# Patient Record
Sex: MALE | URBAN - METROPOLITAN AREA
[De-identification: names, ages, dates, MRNs, and addresses within clinical notes are randomized per-mention and may not be internally consistent; named-entity substitution may affect disease eponyms.]

---

## 2022-01-25 ENCOUNTER — APPOINTMENT (RX ONLY)
Dept: URBAN - METROPOLITAN AREA CLINIC 330 | Facility: CLINIC | Age: 67
Setting detail: DERMATOLOGY
End: 2022-01-25

## 2022-01-25 DIAGNOSIS — L82.1 OTHER SEBORRHEIC KERATOSIS: ICD-10-CM

## 2022-01-25 DIAGNOSIS — L21.8 OTHER SEBORRHEIC DERMATITIS: ICD-10-CM

## 2022-01-25 DIAGNOSIS — D18.0 HEMANGIOMA: ICD-10-CM

## 2022-01-25 DIAGNOSIS — L81.4 OTHER MELANIN HYPERPIGMENTATION: ICD-10-CM

## 2022-01-25 DIAGNOSIS — L57.0 ACTINIC KERATOSIS: ICD-10-CM

## 2022-01-25 DIAGNOSIS — D22 MELANOCYTIC NEVI: ICD-10-CM

## 2022-01-25 PROBLEM — D22.4 MELANOCYTIC NEVI OF SCALP AND NECK: Status: ACTIVE | Noted: 2022-01-25

## 2022-01-25 PROBLEM — D22.62 MELANOCYTIC NEVI OF LEFT UPPER LIMB, INCLUDING SHOULDER: Status: ACTIVE | Noted: 2022-01-25

## 2022-01-25 PROBLEM — D18.01 HEMANGIOMA OF SKIN AND SUBCUTANEOUS TISSUE: Status: ACTIVE | Noted: 2022-01-25

## 2022-01-25 PROBLEM — D22.61 MELANOCYTIC NEVI OF RIGHT UPPER LIMB, INCLUDING SHOULDER: Status: ACTIVE | Noted: 2022-01-25

## 2022-01-25 PROBLEM — D22.5 MELANOCYTIC NEVI OF TRUNK: Status: ACTIVE | Noted: 2022-01-25

## 2022-01-25 PROCEDURE — ? COUNSELING

## 2022-01-25 PROCEDURE — ? PRESCRIPTION

## 2022-01-25 PROCEDURE — 99213 OFFICE O/P EST LOW 20 MIN: CPT | Mod: 25

## 2022-01-25 PROCEDURE — ? FULL BODY SKIN EXAM

## 2022-01-25 PROCEDURE — ? LIQUID NITROGEN

## 2022-01-25 PROCEDURE — ? ADDITIONAL NOTES

## 2022-01-25 PROCEDURE — 17000 DESTRUCT PREMALG LESION: CPT

## 2022-01-25 PROCEDURE — ? PRESCRIPTION MEDICATION MANAGEMENT

## 2022-01-25 PROCEDURE — 17003 DESTRUCT PREMALG LES 2-14: CPT

## 2022-01-25 PROCEDURE — ? TREATMENT REGIMEN

## 2022-01-25 RX ORDER — CLOBETASOL PROPIONATE 0.5 MG/ML
SOLUTION TOPICAL
Qty: 50 | Refills: 4 | Status: ERX | COMMUNITY
Start: 2022-01-25

## 2022-01-25 RX ADMIN — CLOBETASOL PROPIONATE: 0.5 SOLUTION TOPICAL at 00:00

## 2022-01-25 ASSESSMENT — LOCATION DETAILED DESCRIPTION DERM
LOCATION DETAILED: LEFT CLAVICULAR SKIN
LOCATION DETAILED: RIGHT CLAVICULAR NECK
LOCATION DETAILED: RIGHT INFERIOR LATERAL FOREHEAD
LOCATION DETAILED: LEFT DISTAL POSTERIOR UPPER ARM
LOCATION DETAILED: LEFT PROXIMAL POSTERIOR UPPER ARM
LOCATION DETAILED: LEFT LATERAL DISTAL PRETIBIAL REGION
LOCATION DETAILED: LEFT BUTTOCK
LOCATION DETAILED: INFERIOR THORACIC SPINE
LOCATION DETAILED: LEFT PROXIMAL PRETIBIAL REGION
LOCATION DETAILED: LEFT SUPERIOR UPPER BACK
LOCATION DETAILED: RIGHT LATERAL SUPERIOR CHEST
LOCATION DETAILED: RIGHT PROXIMAL PRETIBIAL REGION
LOCATION DETAILED: LEFT INFERIOR UPPER BACK
LOCATION DETAILED: LEFT INFERIOR LATERAL LOWER BACK
LOCATION DETAILED: RIGHT MEDIAL TRAPEZIAL NECK
LOCATION DETAILED: LEFT INFERIOR MEDIAL MIDBACK
LOCATION DETAILED: RIGHT ANTERIOR PROXIMAL UPPER ARM
LOCATION DETAILED: RIGHT PROXIMAL POSTERIOR UPPER ARM
LOCATION DETAILED: LEFT ANTERIOR SHOULDER
LOCATION DETAILED: LEFT CENTRAL MALAR CHEEK
LOCATION DETAILED: RIGHT DISTAL POSTERIOR UPPER ARM
LOCATION DETAILED: LEFT CENTRAL ZYGOMA

## 2022-01-25 ASSESSMENT — LOCATION ZONE DERM
LOCATION ZONE: ARM
LOCATION ZONE: LEG
LOCATION ZONE: TRUNK
LOCATION ZONE: NECK
LOCATION ZONE: TRUNK
LOCATION ZONE: FACE

## 2022-01-25 ASSESSMENT — LOCATION SIMPLE DESCRIPTION DERM
LOCATION SIMPLE: LEFT ZYGOMA
LOCATION SIMPLE: LEFT LOWER BACK
LOCATION SIMPLE: LEFT PRETIBIAL REGION
LOCATION SIMPLE: LEFT BUTTOCK
LOCATION SIMPLE: LEFT POSTERIOR UPPER ARM
LOCATION SIMPLE: RIGHT UPPER ARM
LOCATION SIMPLE: RIGHT PRETIBIAL REGION
LOCATION SIMPLE: UPPER BACK
LOCATION SIMPLE: LEFT SHOULDER
LOCATION SIMPLE: LEFT CHEEK
LOCATION SIMPLE: RIGHT ANTERIOR NECK
LOCATION SIMPLE: LEFT CLAVICULAR SKIN
LOCATION SIMPLE: LEFT UPPER ARM
LOCATION SIMPLE: LEFT UPPER BACK
LOCATION SIMPLE: CHEST
LOCATION SIMPLE: RIGHT POSTERIOR UPPER ARM
LOCATION SIMPLE: LEFT LOWER BACK
LOCATION SIMPLE: POSTERIOR NECK
LOCATION SIMPLE: RIGHT FOREHEAD

## 2024-09-24 ENCOUNTER — HOSPITAL ENCOUNTER (OUTPATIENT)
Dept: PHYSICAL THERAPY | Age: 69
Setting detail: RECURRING SERIES
Discharge: HOME | End: 2024-09-27
Payer: COMMERCIAL

## 2024-09-24 DIAGNOSIS — G89.29 CHRONIC RIGHT SHOULDER PAIN: Primary | ICD-10-CM

## 2024-09-24 DIAGNOSIS — R29.898 DECREASED STRENGTH OF UPPER EXTREMITY: ICD-10-CM

## 2024-09-24 DIAGNOSIS — M25.611 STIFFNESS OF RIGHT SHOULDER, NOT ELSEWHERE CLASSIFIED: ICD-10-CM

## 2024-09-24 DIAGNOSIS — M25.511 CHRONIC RIGHT SHOULDER PAIN: Primary | ICD-10-CM

## 2024-09-24 PROCEDURE — 97110 THERAPEUTIC EXERCISES: CPT

## 2024-09-24 PROCEDURE — 97161 PT EVAL LOW COMPLEX 20 MIN: CPT

## 2024-09-24 ASSESSMENT — PAIN SCALES - GENERAL: PAINLEVEL_OUTOF10: 5

## 2024-09-26 ENCOUNTER — HOSPITAL ENCOUNTER (OUTPATIENT)
Dept: PHYSICAL THERAPY | Age: 69
Setting detail: RECURRING SERIES
Discharge: HOME | End: 2024-09-29
Payer: COMMERCIAL

## 2024-09-26 PROCEDURE — 97110 THERAPEUTIC EXERCISES: CPT

## 2024-09-26 ASSESSMENT — PAIN SCALES - GENERAL: PAINLEVEL_OUTOF10: 5

## 2024-09-26 NOTE — PROGRESS NOTES
living skills and compensatory activities     Goals: (Goals have been discussed and agreed upon with patient.)  Short-Term Functional Goals: Time Frame: 3 weeks  Independent performance of home exercise program  Improve pain free overhead reach to full ROM.   Improve shoulder strengths to 75% without pain (ER)   Discharge Goals: Time Frame: 7 weeks  Improve Quick DASH score to 15/55 or better to reflect restored right UE shoulder capacity.   Improve pain free right shoulder ROM's to full - all planes  Improve overhead reach lifting capacity to at least 8#., perform reaching without pain.   Date: 9/24/24 (visit 1)  9/26/24 (visit 2)       Modalities:                                Therapeutic Exercise: 24 min  40 min        End range stetching ER, IR , flexion (reclined) UBE 6 min # 3 resist.        HEP instruction (see exercise list below, all exercises practiced, written instructions issued) R jessica end range stretching : flex, ER, IR horiz adduction.         Supine flexion : arom x 30 , 2# x 30         Rhythmic stab mid flexion supine x 30         Sidelying ER R 3  x12 @ 2#        Prone R jessica extn 2# 3 x 12        Prone horiz abd 0# 3 x 12        Prone abd to 90 deg 3 x 12 0#        Double arm wall slide flexion w/ green band at wrists 2 x 20                                               Proprioceptive Activities:                                Manual Therapy:                        Functional Activities:                                        Access Code: [email or web address]  URL: [email or web address]  Date: 09/24/2024  Prepared by: Fermin Trinh    Exercises  - Shoulder W - External Rotation with Resistance  - 1 x daily - 7 x weekly - 3 sets - 10 reps  - Shoulder Flexion Wall Slide with Resistance Band  - 1 x daily - 7 x weekly - 3 sets - 10 reps  - Standing Shoulder Internal Rotation with Anchored Resistance  - 1 x daily - 7 x weekly - 3 sets - 10 reps  - Shoulder External Rotation with Anchored Resistance

## 2024-10-02 ENCOUNTER — APPOINTMENT (OUTPATIENT)
Dept: PHYSICAL THERAPY | Age: 69
End: 2024-10-02
Payer: COMMERCIAL

## 2024-10-04 ENCOUNTER — HOSPITAL ENCOUNTER (OUTPATIENT)
Dept: PHYSICAL THERAPY | Age: 69
Setting detail: RECURRING SERIES
Discharge: HOME | End: 2024-10-07
Payer: COMMERCIAL

## 2024-10-04 ENCOUNTER — APPOINTMENT (OUTPATIENT)
Dept: PHYSICAL THERAPY | Age: 69
End: 2024-10-04
Payer: COMMERCIAL

## 2024-10-04 PROCEDURE — 97110 THERAPEUTIC EXERCISES: CPT

## 2024-10-04 ASSESSMENT — PAIN SCALES - GENERAL: PAINLEVEL_OUTOF10: 5

## 2024-10-04 NOTE — PROGRESS NOTES
compensatory activities     Goals: (Goals have been discussed and agreed upon with patient.)  Short-Term Functional Goals: Time Frame: 3 weeks  Independent performance of home exercise program  Improve pain free overhead reach to full ROM.   Improve shoulder strengths to 75% without pain (ER)   Discharge Goals: Time Frame: 7 weeks  Improve Quick DASH score to 15/55 or better to reflect restored right UE shoulder capacity.   Improve pain free right shoulder ROM's to full - all planes  Improve overhead reach lifting capacity to at least 8#., perform reaching without pain.   Date: 9/24/24 (visit 1)  9/26/24 (visit 2)  10/4/24 (visit 3 )      Modalities:                                Therapeutic Exercise: 24 min  40 min  40 min       End range stetching ER, IR , flexion (reclined) UBE 6 min # 3 resist.  UBE - 7 min, # 2 resistance      HEP instruction (see exercise list below, all exercises practiced, written instructions issued) R jessica end range stretching : flex, ER, IR horiz adduction.  R jessica end range stretching - flex, ER (neutral and abducted), IR (*abducted)       Supine flexion : arom x 30 , 2# x 30  Supine flexion: 3# x 30        Rhythmic stab mid flexion supine x 30  Sidelyng R jessica ER w/ 3#: 2 x 20        Sidelying ER R 3  x12 @ 2# Prone R jessica progression: extn 3# x 30, horiz abd 3# 2 x 20       Prone R jessica extn 2# 3 x 12 Standing red tubing R jessica ER 2 x 20       Prone horiz abd 0# 3 x 12 Standing abducted R arm IR wall dribbling x 2 min       Prone abd to 90 deg 3 x 12 0# Standing R jessica flexed arm weightbearing cw/ccw into 3.5# medball x 3 min       Double arm wall slide flexion w/ green band at wrists 2 x 20  R jessica IR (arm at side ) x 30 - doubled blue tubing.                                              Proprioceptive Activities:                                Manual Therapy:                        Functional Activities:                                        Access Code: [email or web address]  URL:

## 2024-10-09 ENCOUNTER — HOSPITAL ENCOUNTER (OUTPATIENT)
Dept: PHYSICAL THERAPY | Age: 69
Setting detail: RECURRING SERIES
Discharge: HOME | End: 2024-10-12
Payer: COMMERCIAL

## 2024-10-09 PROCEDURE — 97110 THERAPEUTIC EXERCISES: CPT

## 2024-10-11 ENCOUNTER — HOSPITAL ENCOUNTER (OUTPATIENT)
Dept: PHYSICAL THERAPY | Age: 69
Setting detail: RECURRING SERIES
Discharge: HOME | End: 2024-10-14
Payer: COMMERCIAL

## 2024-10-11 PROCEDURE — 97110 THERAPEUTIC EXERCISES: CPT

## 2024-10-11 NOTE — PROGRESS NOTES
10/23/2024  8:45 AM Fermin Trinh, PT [facility]   10/25/2024  9:30 AM Kiana Brunson, PTA [facility]   10/30/2024  8:00 AM Kiana Brunson, PTA [facility]   11/1/2024  8:00 AM Kiana Brunson, PTA [facility]

## 2024-10-16 ENCOUNTER — HOSPITAL ENCOUNTER (OUTPATIENT)
Dept: PHYSICAL THERAPY | Age: 69
Setting detail: RECURRING SERIES
Discharge: HOME | End: 2024-10-19
Payer: COMMERCIAL

## 2024-10-16 PROCEDURE — 97110 THERAPEUTIC EXERCISES: CPT

## 2024-10-16 NOTE — PROGRESS NOTES
Garrett Mark  : 1955  Primary: Estebanute Devyn (Commercial)  Secondary:  [facility] [place]  [address] [place] [address]  Phone: [phone]  Fax: [phone] Plan Frequency: 1x to 2x/week  Plan of Care/Certification Expiration Date: 24        Plan of Care/Certification Expiration Date:  Plan of Care/Certification Expiration Date: 24    Frequency/Duration: Plan Frequency: 1x to 2x/week      Time In/Out:   Time In: 08  Time Out: 08      PT Visit Info:         Visit Count:  6    OUTPATIENT PHYSICAL THERAPY:   Treatment Note 10/16/2024       Episode  (- community : [facility] impingement)               Treatment Diagnosis:    Chronic right shoulder pain  Stiffness of right shoulder, not elsewhere classified  Decreased strength of upper extremity  Medical/Referring Diagnosis:    Impingement syndrome of right shoulder    Referring Physician:  Elza Brunner, NARESH Carmen NP  MD Orders:  PT Eval and Treat   Return MD Appt:  TBD   Date of Onset:  Onset Date: 24     Allergies:   Patient has no allergy information on record.  Restrictions/Precautions:   None      Interventions Planned (Treatment may consist of any combination of the following):     See Assessment Note    Subjective Comments:   Pt states \"I was able to sleep on my shoulder last night.\"    Initial Pain Level::    3/10  Post Session Pain Level:      3/10  Medications Last Reviewed:  10/16/2024  Updated Objective Findings:  Findings from initial evaluation unless otherwise noted:   Hand/Side Dominance: Right  Observation:   Posture: rounded forward shoulders.  Swelling/Edema: None  Palpation/joint mobility: impaired GH ant/post glides. + Allen Flash impingement test, neg. Bledsoe's test. Neg. Drop  arm , neg lag tests.      A/PROM Measures:        Shoulder Right Left Comment (end feel)   Flexion 135 deg 150 deg      Abduction 135 deg 147 deg Right = end range pain   Scaption      IR  40 deg 60 deg

## 2024-10-18 ENCOUNTER — HOSPITAL ENCOUNTER (OUTPATIENT)
Dept: PHYSICAL THERAPY | Age: 69
Setting detail: RECURRING SERIES
End: 2024-10-18
Payer: COMMERCIAL

## 2024-10-18 NOTE — PROGRESS NOTES
Garrett Flores  : 1955  Primary: Estebanute Rpn  Secondary:  [facility] [place]  [place] [address]  Phone: [phone]  Fax: [phone]    PT Visit Info:    No data recorded   OT Visit Info:  No data recorded    OUTPATIENT THERAPY: 10/18/2024  Episode  Appt Desk        Garrett Flores did not show for his appointment for today.  Will plan to follow up next during next appointment.    []  Attempted to call patient    Thank you,  Fermin Trinh, PT    Future Appointments   Date Time Provider Department Center   10/23/2024  8:45 AM Fermin Trinh, PT [facility]   10/25/2024  9:30 AM Kiana Brunson, PTA [facility]   10/30/2024  8:00 AM Kiana Brunson, PTA [facility]   2024  8:00 AM Kiana Brunson, PTA [facility]

## 2024-10-23 ENCOUNTER — APPOINTMENT (OUTPATIENT)
Dept: PHYSICAL THERAPY | Age: 69
End: 2024-10-23
Payer: COMMERCIAL

## 2024-10-25 ENCOUNTER — APPOINTMENT (OUTPATIENT)
Dept: PHYSICAL THERAPY | Age: 69
End: 2024-10-25
Payer: COMMERCIAL

## 2024-10-30 ENCOUNTER — APPOINTMENT (OUTPATIENT)
Dept: PHYSICAL THERAPY | Age: 69
End: 2024-10-30
Payer: COMMERCIAL